# Patient Record
Sex: FEMALE | Race: WHITE
[De-identification: names, ages, dates, MRNs, and addresses within clinical notes are randomized per-mention and may not be internally consistent; named-entity substitution may affect disease eponyms.]

---

## 2018-11-11 ENCOUNTER — HOSPITAL ENCOUNTER (EMERGENCY)
Dept: HOSPITAL 47 - EC | Age: 58
Discharge: HOME | End: 2018-11-11
Payer: COMMERCIAL

## 2018-11-11 VITALS — SYSTOLIC BLOOD PRESSURE: 145 MMHG | HEART RATE: 55 BPM | DIASTOLIC BLOOD PRESSURE: 65 MMHG | TEMPERATURE: 98.3 F

## 2018-11-11 VITALS — RESPIRATION RATE: 20 BRPM

## 2018-11-11 DIAGNOSIS — E78.5: ICD-10-CM

## 2018-11-11 DIAGNOSIS — Z91.011: ICD-10-CM

## 2018-11-11 DIAGNOSIS — Z87.891: ICD-10-CM

## 2018-11-11 DIAGNOSIS — Z79.899: ICD-10-CM

## 2018-11-11 DIAGNOSIS — Z88.2: ICD-10-CM

## 2018-11-11 DIAGNOSIS — Z91.018: ICD-10-CM

## 2018-11-11 DIAGNOSIS — H53.9: ICD-10-CM

## 2018-11-11 DIAGNOSIS — Z91.09: ICD-10-CM

## 2018-11-11 DIAGNOSIS — G43.909: Primary | ICD-10-CM

## 2018-11-11 LAB
ALBUMIN SERPL-MCNC: 4.3 G/DL (ref 3.5–5)
ALP SERPL-CCNC: 64 U/L (ref 38–126)
ALT SERPL-CCNC: 47 U/L (ref 9–52)
ANION GAP SERPL CALC-SCNC: 6 MMOL/L
AST SERPL-CCNC: 37 U/L (ref 14–36)
BASOPHILS # BLD AUTO: 0 K/UL (ref 0–0.2)
BASOPHILS NFR BLD AUTO: 1 %
BUN SERPL-SCNC: 16 MG/DL (ref 7–17)
CALCIUM SPEC-MCNC: 9.7 MG/DL (ref 8.4–10.2)
CHLORIDE SERPL-SCNC: 105 MMOL/L (ref 98–107)
CO2 SERPL-SCNC: 30 MMOL/L (ref 22–30)
EOSINOPHIL # BLD AUTO: 0.1 K/UL (ref 0–0.7)
EOSINOPHIL NFR BLD AUTO: 2 %
ERYTHROCYTE [DISTWIDTH] IN BLOOD BY AUTOMATED COUNT: 4.96 M/UL (ref 3.8–5.4)
ERYTHROCYTE [DISTWIDTH] IN BLOOD: 13.2 % (ref 11.5–15.5)
GLUCOSE SERPL-MCNC: 95 MG/DL (ref 74–99)
HCT VFR BLD AUTO: 43.9 % (ref 34–46)
HGB BLD-MCNC: 14.4 GM/DL (ref 11.4–16)
LYMPHOCYTES # SPEC AUTO: 1.2 K/UL (ref 1–4.8)
LYMPHOCYTES NFR SPEC AUTO: 23 %
MCH RBC QN AUTO: 29.1 PG (ref 25–35)
MCHC RBC AUTO-ENTMCNC: 32.9 G/DL (ref 31–37)
MCV RBC AUTO: 88.6 FL (ref 80–100)
MONOCYTES # BLD AUTO: 0.3 K/UL (ref 0–1)
MONOCYTES NFR BLD AUTO: 5 %
NEUTROPHILS # BLD AUTO: 3.6 K/UL (ref 1.3–7.7)
NEUTROPHILS NFR BLD AUTO: 68 %
PLATELET # BLD AUTO: 214 K/UL (ref 150–450)
POTASSIUM SERPL-SCNC: 4.3 MMOL/L (ref 3.5–5.1)
PROT SERPL-MCNC: 7.2 G/DL (ref 6.3–8.2)
SODIUM SERPL-SCNC: 141 MMOL/L (ref 137–145)
WBC # BLD AUTO: 5.3 K/UL (ref 3.8–10.6)

## 2018-11-11 PROCEDURE — 36415 COLL VENOUS BLD VENIPUNCTURE: CPT

## 2018-11-11 PROCEDURE — 80053 COMPREHEN METABOLIC PANEL: CPT

## 2018-11-11 PROCEDURE — 99284 EMERGENCY DEPT VISIT MOD MDM: CPT

## 2018-11-11 PROCEDURE — 85025 COMPLETE CBC W/AUTO DIFF WBC: CPT

## 2018-11-11 PROCEDURE — 70450 CT HEAD/BRAIN W/O DYE: CPT

## 2018-11-11 PROCEDURE — 70496 CT ANGIOGRAPHY HEAD: CPT

## 2018-11-11 NOTE — ED
General Adult HPI





- General


Chief complaint: Eye Problems


Stated complaint: Visual Disturbance, Headache


Time Seen by Provider: 18 12:18


Source: patient, RN notes reviewed


Mode of arrival: ambulatory


Limitations: no limitations





- History of Present Illness


Initial comments: 





Patient is a 58-year-old female presented to the emergency room today with a 

chief complaint of headache and visual change.  Patient does admit that 2 days 

ago she noticed that she had a halo type appearance on the lateral portion of 

the left eye.  She states that her last a few hours and then eventually went 

away.  She states that today she woke up with a migraine headache.  She states 

the migraine is consistent with migraine headaches that she's had in the past.  

She states she's had some nausea, photosensitivity a long with a headache.  She 

states is consistent with her migraines.  She states approximately at 12:00 she 

began noticing the visual changes that she experienced 2 days ago.  She states 

again she was experiencing a halo and blurriness out of the lateral portion of 

the left eye.  Patient states that she did take her migraine medication of 

Fioricet along with an aspirin.  She states visual symptoms resolved shortly 

after taking this medication.  She states her headache is also gone at this 

time.  She states there are no symptoms currently.  She states she was just 

concerned as there is a family history of brain aneurysm.  Patient denies any 

recent fever, chills, shortness of breath, chest pain, back pain, abdominal pain

, nausea or vomiting, numbness or tingling, or any other complaints.





- Related Data


 Home Medications











 Medication  Instructions  Recorded  Confirmed


 


Butalb/Acetaminophen/Caffeine 1 tab PO DAILY PRN 10/31/16 11/11/18





[Fioricet -40 mg Capsule]   


 


Atorvastatin [Lipitor] 10 mg PO HS 18


 


Biotin 5 mg PO DAILY 18


 


Calcium Carbonate [Calcium] 600 mg PO DAILY 18


 


Cholecalciferol [Vitamin D3] 1,000 unit PO DAILY 18


 


Omega-3 Fatty Acids/Fish Oil [Fish 1 cap PO DAILY 18





Oil 1,000 mg Softgel]   











 Allergies











Allergy/AdvReac Type Severity Reaction Status Date / Time


 


cat dander Allergy  Unknown Verified 11/11/18 13:23


 


Milk Containing Products Allergy  Nausea & Verified 18 13:23





[Dairy]   Vomiting  


 


orange Allergy  Rash/Hives Verified 18 13:23


 


Sulfa (Sulfonamide Allergy  Rash/Hives Verified 18 13:23





Antibiotics)     


 


tomato Allergy  Rash/Hives Verified 18 13:23














Review of Systems


ROS Statement: 


Those systems with pertinent positive or pertinent negative responses have been 

documented in the HPI.





ROS Other: All systems not noted in ROS Statement are negative.





Past Medical History


Past Medical History: Hyperlipidemia, Skin Disorder


Additional Past Medical History / Comment(s): ABDOMINAL PAIN, BURNING, 

HYPOGLYCEMIA, HX PSORIASIS, HX OF MIGRAINES, HEART MURMUR,


History of Any Multi-Drug Resistant Organisms: None Reported


Past Surgical History:  Section, Hysterectomy


Additional Past Surgical History / Comment(s): SKIN BX


Past Anesthesia/Blood Transfusion Reactions: Previous Problems w/ Anesthesia


Additional Past Anesthesia/Blood Transfusion Reaction / Comment(s): HYPOTENSION,


Past Psychological History: No Psychological Hx Reported


Smoking Status: Former smoker


Past Alcohol Use History: None Reported


Past Drug Use History: None Reported





- Past Family History


  ** Mother


Family Medical History: Pulmonary Embolus





General Exam





- General Exam Comments


Initial Comments: 





General:  The patient is awake and alert, in no distress, and does not appear 

acutely ill. 


Eye:  Pupils are equal, round and reactive to light. Extra-ocular movements are 

intact.  No nystagmus.  There is normal conjunctiva bilaterally.  No signs of 

icterus.  


Ears, nose, mouth and throat:  There are moist mucous membranes and no oral 

lesions. 


Neck:  The neck is supple, there is no tenderness or JVD.  


Cardiovascular:  There is a regular rate and rhythm. No murmur, rub or gallop 

is appreciated.


Respiratory:  Lungs are clear to auscultation, respirations are non-labored, 

breath sounds are equal.  No wheezes, stridor, rales, or rhonchi.


Musculoskeletal:  Normal ROM, no tenderness.  Sensation intact. Strength 5/5. 

Pulses equal bilaterally 2+.  


Neurological:  A&O x 3. CN II-XII intact, There are no obvious motor or sensory 

deficits. Coordination appears grossly intact. Speech is normal.


Skin:  Skin is warm and dry and no rashes or lesions are noted. 


Psychiatric:  Cooperative, appropriate mood & affect, normal judgment.  


Limitations: no limitations





Course


 Vital Signs











  18





  12:12 14:01


 


Temperature 98.6 F 


 


Pulse Rate 63 52 L


 


Respiratory 16 20





Rate  


 


Blood Pressure 147/78 136/83


 


O2 Sat by Pulse 99 100





Oximetry  














Medical Decision Making





- Medical Decision Making





CT with and without contrast obtained of the head negative for any acute 

abnormalities.  Labs reviewed.  Patient's been asymptomatic here in emergency 

room.  Patient advised to follow-up with ophthalmologist tomorrow.  Advised 

return if symptoms recur.  Otherwise follow-up family physician over the next 2 

days.





- Lab Data


Result diagrams: 


 18 14:00





 18 14:00


 Lab Results











  18 Range/Units





  14:00 14:00 


 


WBC  5.3   (3.8-10.6)  k/uL


 


RBC  4.96   (3.80-5.40)  m/uL


 


Hgb  14.4   (11.4-16.0)  gm/dL


 


Hct  43.9   (34.0-46.0)  %


 


MCV  88.6   (80.0-100.0)  fL


 


MCH  29.1   (25.0-35.0)  pg


 


MCHC  32.9   (31.0-37.0)  g/dL


 


RDW  13.2   (11.5-15.5)  %


 


Plt Count  214   (150-450)  k/uL


 


Neutrophils %  68   %


 


Lymphocytes %  23   %


 


Monocytes %  5   %


 


Eosinophils %  2   %


 


Basophils %  1   %


 


Neutrophils #  3.6   (1.3-7.7)  k/uL


 


Lymphocytes #  1.2   (1.0-4.8)  k/uL


 


Monocytes #  0.3   (0-1.0)  k/uL


 


Eosinophils #  0.1   (0-0.7)  k/uL


 


Basophils #  0.0   (0-0.2)  k/uL


 


Sodium   141  (137-145)  mmol/L


 


Potassium   4.3  (3.5-5.1)  mmol/L


 


Chloride   105  ()  mmol/L


 


Carbon Dioxide   30  (22-30)  mmol/L


 


Anion Gap   6  mmol/L


 


BUN   16  (7-17)  mg/dL


 


Creatinine   0.73  (0.52-1.04)  mg/dL


 


Est GFR (CKD-EPI)AfAm   >90  (>60 ml/min/1.73 sqM)  


 


Est GFR (CKD-EPI)NonAf   >90  (>60 ml/min/1.73 sqM)  


 


Glucose   95  (74-99)  mg/dL


 


Calcium   9.7  (8.4-10.2)  mg/dL


 


Total Bilirubin   0.3  (0.2-1.3)  mg/dL


 


AST   37 H  (14-36)  U/L


 


ALT   47  (9-52)  U/L


 


Alkaline Phosphatase   64  ()  U/L


 


Total Protein   7.2  (6.3-8.2)  g/dL


 


Albumin   4.3  (3.5-5.0)  g/dL














Disposition


Clinical Impression: 


 Migraine, Visual disturbance





Disposition: HOME SELF-CARE


Condition: Good


Instructions:  Migraine Headache (ED)


Additional Instructions: 


Please follow-up with ophthalmologist tomorrow as discussed.  Please call 

family doctor over the next 2 days.  Return to emergency room if any symptoms 

increase or worsen.


Is patient prescribed a controlled substance at d/c from ED?: No


Referrals: 


Colton Pedraza DO [Primary Care Provider] - 1-2 days


Owen Martinez MD [STAFF PHYSICIAN] - 1-2 days


Time of Disposition: 14:54

## 2018-11-11 NOTE — CT
EXAMINATION TYPE: CT angio head

 

DATE OF EXAM: 11/11/2018

 

COMPARISON: None

 

HISTORY: Visual disturbance, headaches

 

CT DLP: 868 mGycm

Automated exposure control for dose reduction was used.

 

CONTRAST: 

Performed with IV Contrast, patient injected with 100 mL of Isovue 370.

There are 3-D post processed images.

FINDINGS: 

There is arterial flow in the anterior middle and posterior cerebral arteries. There is arterial flow
 in the vertebrobasilar artery system. There is bilateral patency of the vertebral arteries. There is
 bilateral arterial flow in the intracranial internal carotid arteries. There is normal contrast opac
ification of the venous sinuses. I see no evidence of intracranial arterial stenosis. I see no eviden
ce of aneurysm or neovascularity. There is no mass effect.

 

IMPRESSION: 

NEGATIVE CT ANGIOGRAM OF THE BRAIN.

## 2018-11-11 NOTE — CT
EXAMINATION TYPE: CT brain wo con

 

DATE OF EXAM: 11/11/2018

 

COMPARISON: Previous study dated 7/17/2013.

 

HISTORY: Migraines, vision changes

 

CT DLP: 1191.4 mGycm

Automated exposure control for dose reduction was used.

 

FINDINGS: 

Central structures are midline. There is no evidence of hydrocephalus. No acute focal lesion, mass ef
fect or midline shift is seen. I do not see evidence of intracranial blood.

 

Visualized portions of the paranasal sinuses and mastoids are clear. The bony calvarium is intact.

 

IMPRESSION: 

 

NO ACUTE INTRACRANIAL ABNORMALITY.

## 2020-10-18 ENCOUNTER — HOSPITAL ENCOUNTER (INPATIENT)
Dept: HOSPITAL 47 - EC | Age: 60
LOS: 3 days | Discharge: HOME | DRG: 287 | End: 2020-10-21
Payer: COMMERCIAL

## 2020-10-18 DIAGNOSIS — Z98.891: ICD-10-CM

## 2020-10-18 DIAGNOSIS — Z91.018: ICD-10-CM

## 2020-10-18 DIAGNOSIS — Z90.710: ICD-10-CM

## 2020-10-18 DIAGNOSIS — Z88.2: ICD-10-CM

## 2020-10-18 DIAGNOSIS — Z91.011: ICD-10-CM

## 2020-10-18 DIAGNOSIS — Z87.891: ICD-10-CM

## 2020-10-18 DIAGNOSIS — Z91.09: ICD-10-CM

## 2020-10-18 DIAGNOSIS — G43.909: ICD-10-CM

## 2020-10-18 DIAGNOSIS — R07.89: Primary | ICD-10-CM

## 2020-10-18 DIAGNOSIS — Z79.899: ICD-10-CM

## 2020-10-18 DIAGNOSIS — I08.0: ICD-10-CM

## 2020-10-18 DIAGNOSIS — Z79.82: ICD-10-CM

## 2020-10-18 DIAGNOSIS — L40.9: ICD-10-CM

## 2020-10-18 DIAGNOSIS — Z98.890: ICD-10-CM

## 2020-10-18 DIAGNOSIS — R00.1: ICD-10-CM

## 2020-10-18 DIAGNOSIS — Z82.49: ICD-10-CM

## 2020-10-18 DIAGNOSIS — E78.5: ICD-10-CM

## 2020-10-18 DIAGNOSIS — I27.20: ICD-10-CM

## 2020-10-18 LAB
ALBUMIN SERPL-MCNC: 4.1 G/DL (ref 3.5–5)
ALP SERPL-CCNC: 61 U/L (ref 38–126)
ALT SERPL-CCNC: 28 U/L (ref 4–34)
ANION GAP SERPL CALC-SCNC: 4 MMOL/L
APTT BLD: 22.7 SEC (ref 22–30)
AST SERPL-CCNC: 36 U/L (ref 14–36)
BASOPHILS # BLD AUTO: 0.1 K/UL (ref 0–0.2)
BASOPHILS NFR BLD AUTO: 1 %
BUN SERPL-SCNC: 17 MG/DL (ref 7–17)
CALCIUM SPEC-MCNC: 9.4 MG/DL (ref 8.4–10.2)
CHLORIDE SERPL-SCNC: 103 MMOL/L (ref 98–107)
CO2 SERPL-SCNC: 28 MMOL/L (ref 22–30)
EOSINOPHIL # BLD AUTO: 0.1 K/UL (ref 0–0.7)
EOSINOPHIL NFR BLD AUTO: 2 %
ERYTHROCYTE [DISTWIDTH] IN BLOOD BY AUTOMATED COUNT: 4.81 M/UL (ref 3.8–5.4)
ERYTHROCYTE [DISTWIDTH] IN BLOOD: 13 % (ref 11.5–15.5)
GLUCOSE SERPL-MCNC: 98 MG/DL (ref 74–99)
HCT VFR BLD AUTO: 44 % (ref 34–46)
HGB BLD-MCNC: 14 GM/DL (ref 11.4–16)
INR PPP: 1 (ref ?–1.2)
LYMPHOCYTES # SPEC AUTO: 2 K/UL (ref 1–4.8)
LYMPHOCYTES NFR SPEC AUTO: 31 %
MCH RBC QN AUTO: 29.1 PG (ref 25–35)
MCHC RBC AUTO-ENTMCNC: 31.8 G/DL (ref 31–37)
MCV RBC AUTO: 91.4 FL (ref 80–100)
MONOCYTES # BLD AUTO: 0.4 K/UL (ref 0–1)
MONOCYTES NFR BLD AUTO: 6 %
NEUTROPHILS # BLD AUTO: 3.9 K/UL (ref 1.3–7.7)
NEUTROPHILS NFR BLD AUTO: 60 %
PLATELET # BLD AUTO: 205 K/UL (ref 150–450)
POTASSIUM SERPL-SCNC: 4 MMOL/L (ref 3.5–5.1)
PROT SERPL-MCNC: 6.7 G/DL (ref 6.3–8.2)
PT BLD: 9.9 SEC (ref 9–12)
SODIUM SERPL-SCNC: 135 MMOL/L (ref 137–145)
WBC # BLD AUTO: 6.6 K/UL (ref 3.8–10.6)

## 2020-10-18 PROCEDURE — 83735 ASSAY OF MAGNESIUM: CPT

## 2020-10-18 PROCEDURE — 80053 COMPREHEN METABOLIC PANEL: CPT

## 2020-10-18 PROCEDURE — 84484 ASSAY OF TROPONIN QUANT: CPT

## 2020-10-18 PROCEDURE — 99285 EMERGENCY DEPT VISIT HI MDM: CPT

## 2020-10-18 PROCEDURE — 93458 L HRT ARTERY/VENTRICLE ANGIO: CPT

## 2020-10-18 PROCEDURE — 96361 HYDRATE IV INFUSION ADD-ON: CPT

## 2020-10-18 PROCEDURE — 96360 HYDRATION IV INFUSION INIT: CPT

## 2020-10-18 PROCEDURE — 80048 BASIC METABOLIC PNL TOTAL CA: CPT

## 2020-10-18 PROCEDURE — 71046 X-RAY EXAM CHEST 2 VIEWS: CPT

## 2020-10-18 PROCEDURE — 85025 COMPLETE CBC W/AUTO DIFF WBC: CPT

## 2020-10-18 PROCEDURE — 93017 CV STRESS TEST TRACING ONLY: CPT

## 2020-10-18 PROCEDURE — 93306 TTE W/DOPPLER COMPLETE: CPT

## 2020-10-18 PROCEDURE — 80061 LIPID PANEL: CPT

## 2020-10-18 PROCEDURE — 83880 ASSAY OF NATRIURETIC PEPTIDE: CPT

## 2020-10-18 PROCEDURE — 85730 THROMBOPLASTIN TIME PARTIAL: CPT

## 2020-10-18 PROCEDURE — 36415 COLL VENOUS BLD VENIPUNCTURE: CPT

## 2020-10-18 PROCEDURE — 78452 HT MUSCLE IMAGE SPECT MULT: CPT

## 2020-10-18 PROCEDURE — 93005 ELECTROCARDIOGRAM TRACING: CPT

## 2020-10-18 PROCEDURE — 85610 PROTHROMBIN TIME: CPT

## 2020-10-18 NOTE — ED
SOB HPI





- General


Source: patient


Mode of arrival: ambulatory


Limitations: no limitations





<Chloe Barragan - Last Filed: 10/19/20 00:13>





<Kristi Piper - Last Filed: 10/20/20 13:26>





- General


Chief Complaint: Shortness of Breath


Stated Complaint: Poss Asthma Attack


Time Seen by Provider: 10/18/20 20:05





- History of Present Illness


Initial Comments: 





Patient is a 60-year-old female presenting to emergency Department with 

complaints of an episode of shortness of breath that happened while she was 

cooking dinner just prior to arrival.  Patient states she has felt fine all day,

was cooking dinner and then had an episode of chest tightness and shortness of 

breath that lasted about 2-3 minutes.  Patient states she had a really hard time

catching her breath during this time.  She denies having any sharp chest pains 

but states she did feel tight and "compressed."  Patient states now she is no 

longer having that as it only lasted a few minutes but she is feeling some mild 

nausea and a headache.  She states she does have a history of migraines and did 

not take her Fioricet before she left for the hospital.  Patient denies a 

history of heart disease, she does take a statin for elevated cholesterol.  She 

denies any new medications.  She states it has been a long time since her recent

stress test.  She denies history of asthma or COPD.  Eyes any recent fever, 

chills, cough, congestion, abdominal pain, vomiting or diarrhea.  She has no 

further complaints at this time.  Upon arrival to the ER, her vital signs are 

stable. (Chloe Barragan)





- Related Data


                                Home Medications











 Medication  Instructions  Recorded  Confirmed


 


Butalb/Acetaminophen/Caffeine 1 tab PO DAILY PRN 10/31/16 10/18/20





[Fioricet -40 mg Capsule]   


 


Atorvastatin [Lipitor] 10 mg PO HS 11/11/18 10/18/20


 


Biotin 5 mg PO DAILY 11/11/18 10/18/20


 


Cholecalciferol [Vitamin D3] 1,000 unit PO DAILY 11/11/18 10/18/20


 


Omega-3 Fatty Acids/Fish Oil [Fish 1 cap PO DAILY 11/11/18 10/18/20





Oil 1,000 mg Softgel]   


 


Vitamin E 100 unit PO DAILY 10/18/20 10/18/20











                                    Allergies











Allergy/AdvReac Type Severity Reaction Status Date / Time


 


cat dander Allergy  Unknown Verified 10/18/20 22:28


 


Milk Containing Products Allergy  Nausea & Verified 10/18/20 22:28





[Dairy]   Vomiting  


 


orange Allergy  Rash/Hives Verified 10/18/20 22:28


 


Sulfa (Sulfonamide Allergy  Rash/Hives Verified 10/18/20 22:28





Antibiotics)     


 


tomato Allergy  Rash/Hives Verified 10/18/20 22:28














Review of Systems


ROS Other: All systems not noted in ROS Statement are negative.





<Chloe Barragan - Last Filed: 10/19/20 00:13>


ROS Other: All systems not noted in ROS Statement are negative.





<Kristi Piper - Last Filed: 10/20/20 13:26>


ROS Statement: 


Those systems with pertinent positive or pertinent negative responses have been 

documented in the HPI.








Past Medical History


Past Medical History: Hyperlipidemia, Skin Disorder


Additional Past Medical History / Comment(s): ABDOMINAL PAIN, BURNING, 

HYPOGLYCEMIA, HX PSORIASIS, HX OF MIGRAINES, HEART MURMUR,


History of Any Multi-Drug Resistant Organisms: None Reported


Past Surgical History:  Section, Hysterectomy


Additional Past Surgical History / Comment(s): SKIN BX


Past Anesthesia/Blood Transfusion Reactions: Previous Problems w/ Anesthesia


Additional Past Anesthesia/Blood Transfusion Reaction / Comment(s): HYPOTENSION,


Past Psychological History: No Psychological Hx Reported


Smoking Status: Never smoker


Past Alcohol Use History: None Reported


Past Drug Use History: None Reported





- Past Family History


  ** Mother


Family Medical History: Pulmonary Embolus





<Chloe Barragan - Last Filed: 10/19/20 00:13>





General Exam


Limitations: no limitations





<Chloe Barragan - Last Filed: 10/19/20 00:13>





- General Exam Comments


Initial Comments: 





GENERAL: 


Patient is well-developed and well-nourished.  Patient is nontoxic and in no 

acute distress.





HEAD: 


Atraumatic, normocephalic.





EYES:


Pupils equal round and reactive to light, extraocular movements intact, sclera 

anicteric, conjunctiva are normal.  Eyelids were unremarkable.





ENT: 


TMs normal, nares patent, oropharynx clear without exudates.  Moist mucous 

membranes.





NECK: 


Normal range of motion, supple without lymphadenopathy or JVD.





LUNGS:


Unlabored respirations.  Breath sounds clear to auscultation bilaterally and 

equal.  No wheezes rales or rhonchi.





HEART:


Regular rate and rhythm without murmurs, rubs or gallops.





ABDOMEN: 


Soft, nontender, normoactive bowel sounds.  No guarding, no rebound.  No masses 

appreciated.





: Deferred 





MUSCULOSKELETAL: 


Normal extremities with adequate strength and normal range of motion, no pitting

 or edema.  No clubbing or cyanosis.





NEUROLOGICAL: 


Patient is alert and oriented x 3.  Motor and sensory are also intact.  Cranial 

nerves II through XII grossly intact.  Symmetrical smile.  Normal speech, normal

 gait.   





PSYCH:


Normal mood, normal affect.





SKIN:


 Warm, Dry, normal turgor, no rashes or lesions noted. (Chloe Barragan)





Course





                                   Vital Signs











  10/18/20 10/18/20





  19:58 22:06


 


Temperature 97.5 F L 


 


Pulse Rate 52 L 65


 


Respiratory 16 17





Rate  


 


Blood Pressure 168/94 131/76


 


O2 Sat by Pulse 100 99





Oximetry  














Medical Decision Making





- Lab Data


Result diagrams: 


                                 10/18/20 20:48





                                 10/18/20 20:48





<Chloe Barragan - Last Filed: 10/19/20 00:13>





- Lab Data


Result diagrams: 


                                 10/18/20 20:48





                                 10/18/20 20:48





<Kristi Piper - Last Filed: 10/20/20 13:26>





- Medical Decision Making





Patient is a 60-year-old female presenting after 2-3 minute episode of shortness

 of breath and chest tightness while she was cooking dinner.  Her vital signs 

are stable upon arrival.  Her exam is unremarkable.  EKG shows no acute process.

  Chest x-ray is normal.  Abdomen also completely unremarkable, troponin is 

negative.  I discussed with patient that given her symptoms, recommended 

admission for serial troponins and cardiac consult.  Patient is agreement with 

this plan and care.  Case discussed with Dr. Piper.  Patient accepted by Dr. Maloney. (Chloe Barragan)


I was available for consultation in the emergency department.  The history and 

physical exam were done by the midlevel provider. I was consulted for this patie

nts care. I reviewed the case with the midlevel provider and based on their 

presentation of the patient, I agree with the assessment, medical decision 

making and plan of care as documented. I spoke with Dr. Maloney who agreed to admit

 the patient. 


Chart was dictated using Dragon dictation software.  Attempts were made to 

correct any dictation errors however some typographical errors may persist. 


Patient was seen during a national state of emergency due to the Covid-19 

pandemic. 


 (Kristi Piper)





- Lab Data





                                   Lab Results











  10/18/20 10/18/20 10/18/20 Range/Units





  20:48 20:48 20:48 


 


WBC  6.6    (3.8-10.6)  k/uL


 


RBC  4.81    (3.80-5.40)  m/uL


 


Hgb  14.0    (11.4-16.0)  gm/dL


 


Hct  44.0    (34.0-46.0)  %


 


MCV  91.4    (80.0-100.0)  fL


 


MCH  29.1    (25.0-35.0)  pg


 


MCHC  31.8    (31.0-37.0)  g/dL


 


RDW  13.0    (11.5-15.5)  %


 


Plt Count  205    (150-450)  k/uL


 


Neutrophils %  60    %


 


Lymphocytes %  31    %


 


Monocytes %  6    %


 


Eosinophils %  2    %


 


Basophils %  1    %


 


Neutrophils #  3.9    (1.3-7.7)  k/uL


 


Lymphocytes #  2.0    (1.0-4.8)  k/uL


 


Monocytes #  0.4    (0-1.0)  k/uL


 


Eosinophils #  0.1    (0-0.7)  k/uL


 


Basophils #  0.1    (0-0.2)  k/uL


 


PT   9.9   (9.0-12.0)  sec


 


INR   1.0   (<1.2)  


 


APTT   22.7   (22.0-30.0)  sec


 


Sodium    135 L  (137-145)  mmol/L


 


Potassium    4.0  (3.5-5.1)  mmol/L


 


Chloride    103  ()  mmol/L


 


Carbon Dioxide    28  (22-30)  mmol/L


 


Anion Gap    4  mmol/L


 


BUN    17  (7-17)  mg/dL


 


Creatinine    0.83  (0.52-1.04)  mg/dL


 


Est GFR (CKD-EPI)AfAm    89  (>60 ml/min/1.73 sqM)  


 


Est GFR (CKD-EPI)NonAf    77  (>60 ml/min/1.73 sqM)  


 


Glucose    98  (74-99)  mg/dL


 


Calcium    9.4  (8.4-10.2)  mg/dL


 


Total Bilirubin    0.3  (0.2-1.3)  mg/dL


 


AST    36  (14-36)  U/L


 


ALT    28  (4-34)  U/L


 


Alkaline Phosphatase    61  ()  U/L


 


Troponin I     (0.000-0.034)  ng/mL


 


NT-Pro-B Natriuret Pep     pg/mL


 


Total Protein    6.7  (6.3-8.2)  g/dL


 


Albumin    4.1  (3.5-5.0)  g/dL


 


Triglycerides     (<150)  mg/dL


 


Cholesterol     (<200)  mg/dL


 


LDL Cholesterol, Calc     (0-99)  mg/dL


 


HDL Cholesterol     (40-60)  mg/dL














  10/18/20 10/18/20 10/18/20 Range/Units





  20:48 20:48 23:46 


 


WBC     (3.8-10.6)  k/uL


 


RBC     (3.80-5.40)  m/uL


 


Hgb     (11.4-16.0)  gm/dL


 


Hct     (34.0-46.0)  %


 


MCV     (80.0-100.0)  fL


 


MCH     (25.0-35.0)  pg


 


MCHC     (31.0-37.0)  g/dL


 


RDW     (11.5-15.5)  %


 


Plt Count     (150-450)  k/uL


 


Neutrophils %     %


 


Lymphocytes %     %


 


Monocytes %     %


 


Eosinophils %     %


 


Basophils %     %


 


Neutrophils #     (1.3-7.7)  k/uL


 


Lymphocytes #     (1.0-4.8)  k/uL


 


Monocytes #     (0-1.0)  k/uL


 


Eosinophils #     (0-0.7)  k/uL


 


Basophils #     (0-0.2)  k/uL


 


PT     (9.0-12.0)  sec


 


INR     (<1.2)  


 


APTT     (22.0-30.0)  sec


 


Sodium     (137-145)  mmol/L


 


Potassium     (3.5-5.1)  mmol/L


 


Chloride     ()  mmol/L


 


Carbon Dioxide     (22-30)  mmol/L


 


Anion Gap     mmol/L


 


BUN     (7-17)  mg/dL


 


Creatinine     (0.52-1.04)  mg/dL


 


Est GFR (CKD-EPI)AfAm     (>60 ml/min/1.73 sqM)  


 


Est GFR (CKD-EPI)NonAf     (>60 ml/min/1.73 sqM)  


 


Glucose     (74-99)  mg/dL


 


Calcium     (8.4-10.2)  mg/dL


 


Total Bilirubin     (0.2-1.3)  mg/dL


 


AST     (14-36)  U/L


 


ALT     (4-34)  U/L


 


Alkaline Phosphatase     ()  U/L


 


Troponin I  <0.012   <0.012  (0.000-0.034)  ng/mL


 


NT-Pro-B Natriuret Pep   60   pg/mL


 


Total Protein     (6.3-8.2)  g/dL


 


Albumin     (3.5-5.0)  g/dL


 


Triglycerides     (<150)  mg/dL


 


Cholesterol     (<200)  mg/dL


 


LDL Cholesterol, Calc     (0-99)  mg/dL


 


HDL Cholesterol     (40-60)  mg/dL














  10/19/20 10/19/20 Range/Units





  02:33 02:33 


 


WBC    (3.8-10.6)  k/uL


 


RBC    (3.80-5.40)  m/uL


 


Hgb    (11.4-16.0)  gm/dL


 


Hct    (34.0-46.0)  %


 


MCV    (80.0-100.0)  fL


 


MCH    (25.0-35.0)  pg


 


MCHC    (31.0-37.0)  g/dL


 


RDW    (11.5-15.5)  %


 


Plt Count    (150-450)  k/uL


 


Neutrophils %    %


 


Lymphocytes %    %


 


Monocytes %    %


 


Eosinophils %    %


 


Basophils %    %


 


Neutrophils #    (1.3-7.7)  k/uL


 


Lymphocytes #    (1.0-4.8)  k/uL


 


Monocytes #    (0-1.0)  k/uL


 


Eosinophils #    (0-0.7)  k/uL


 


Basophils #    (0-0.2)  k/uL


 


PT    (9.0-12.0)  sec


 


INR    (<1.2)  


 


APTT    (22.0-30.0)  sec


 


Sodium    (137-145)  mmol/L


 


Potassium    (3.5-5.1)  mmol/L


 


Chloride    ()  mmol/L


 


Carbon Dioxide    (22-30)  mmol/L


 


Anion Gap    mmol/L


 


BUN    (7-17)  mg/dL


 


Creatinine    (0.52-1.04)  mg/dL


 


Est GFR (CKD-EPI)AfAm    (>60 ml/min/1.73 sqM)  


 


Est GFR (CKD-EPI)NonAf    (>60 ml/min/1.73 sqM)  


 


Glucose    (74-99)  mg/dL


 


Calcium    (8.4-10.2)  mg/dL


 


Total Bilirubin    (0.2-1.3)  mg/dL


 


AST    (14-36)  U/L


 


ALT    (4-34)  U/L


 


Alkaline Phosphatase    ()  U/L


 


Troponin I  <0.012   (0.000-0.034)  ng/mL


 


NT-Pro-B Natriuret Pep    pg/mL


 


Total Protein    (6.3-8.2)  g/dL


 


Albumin    (3.5-5.0)  g/dL


 


Triglycerides   56  (<150)  mg/dL


 


Cholesterol   154  (<200)  mg/dL


 


LDL Cholesterol, Calc   81  (0-99)  mg/dL


 


HDL Cholesterol   62 H  (40-60)  mg/dL














- EKG Data


EKG Comments: 





Sinus bradycardia, left axis deviation, no signs of acute ischemia.  Ventricular

 rate 52, ND interval 138, QTc 446. (Chloe Barragan)





Disposition


Decision Date: 10/18/20


Decision Time: 22:11





<Chloe Barragan - Last Filed: 10/19/20 00:13>





<Kristi Piper - Last Filed: 10/20/20 13:26>


Clinical Impression: 


 Chest tightness, Dyspnea





Disposition: ADMITTED AS IP TO THIS [facility]


Condition: Stable

## 2020-10-18 NOTE — XR
EXAMINATION TYPE: XR chest 2V

 

DATE OF EXAM: 10/18/2020

 

COMPARISON: NONE

 

HISTORY: Short of breath

 

TECHNIQUE: 2 views

 

FINDINGS: Heart is normal. Lungs are clear of infiltrate. There are no hilar masses. Mediastinum is n
ormal. There is some pleural thickening at the lung apices.

 

IMPRESSION: Mild pleural scarring at the lung apices. No acute lung disease. Normal heart.

## 2020-10-19 VITALS — RESPIRATION RATE: 16 BRPM

## 2020-10-19 LAB
CHOLEST SERPL-MCNC: 154 MG/DL (ref ?–200)
HDLC SERPL-MCNC: 62 MG/DL (ref 40–60)
LDLC SERPL CALC-MCNC: 81 MG/DL (ref 0–99)
TRIGL SERPL-MCNC: 56 MG/DL (ref ?–150)

## 2020-10-19 RX ADMIN — PANTOPRAZOLE SODIUM SCH MG: 40 INJECTION, POWDER, FOR SOLUTION INTRAVENOUS at 17:50

## 2020-10-19 RX ADMIN — ATORVASTATIN CALCIUM SCH MG: 10 TABLET, FILM COATED ORAL at 20:32
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
## 2020-10-19 NOTE — NM
EXAMINATION TYPE: NM stress lexiscan cardiolite

 

DATE OF EXAM: 10/19/2020

 

COMPARISON: NONE

 

HISTORY: Chest pain

 

TECHNIQUE:  After the intravenous administration of 9.8 mCi Tc 99m Sestamibi - Cardiolite resting SPE
CT images acquired 70 minutes post injection. 

 

The patient received 0.4mg Lexiscan, 27.8 mCi Tc 99m Sestamibi - Stress images obtained 30 minutes po
st injection 

 

FINDINGS:  

 

Review of stress and rest SPECT images demonstrates some mild decreased left ventricular apical uptak
e on stress as compared to rest images.  Gated analysis shows normal wall motion with an estimated le
ft ventricular ejection fraction of 65 %.

 

 

 

IMPRESSION:  

 

Apical left ventricular myocardial ischemia, consider echo correlation for elevated ejection fraction

## 2020-10-19 NOTE — P.STRESS
- Stress Test Note


Stress Test Results/Findings: 


Exam Performed: NM stress lexiscan cardiolite


Exam Date: 10/19/20


Reason for Exam: Chest Pain





Height: 5 ft 6 in


Weight: 68.04 kg


Protocol: Lexiscan


Stage: na


Duration of Exercise: na





Resting Heart Rate: 46


Resting Blood Pressure: 124/72


Maximum Achieved Heart Rate: 81


Maximum Achieved Blood Pressure: 132/72


85% PMHR: 135


100% PMHR: 159


METS: na


Technologist Comment: 





Stress Test Results/Findings: 


At baseline EKG showed ormal sinus rhythm, normal axis,nonspecific T-wave in

versions in aVL, AVR, V1  Patient recieved IV infusion of Lexiscan 0.4mg and at 

peak infusion EKG showed o significant EKG changes from baseline.





Conclusions:


1. Normal EKG response to Lexiscan infusion


2. Nuclear imaging to be reported separately.

## 2020-10-19 NOTE — ECHOF
Referral Reason:LV function



MEASUREMENTS

--------

HEIGHT: 167.6 cm

WEIGHT: 68.0 kg

BP: 130/75

RVIDd:   2.8 cm     (< 3.3)

IVSd:   1.4 cm     (0.6 - 1.1)

LVIDd:   3.9 cm     (3.9 - 5.3)

LVPWd:   1.5 cm     (0.6 - 1.1)

IVSs:   1.7 cm

LVIDs:   2.7 cm

LVPWs:   2.1 cm

LAESV Index (A-L):   28.66 ml/m

MV E Gordo:   0.89 m/s

MV DecT:   203 ms

MV A [place]:   0.69 m/s

MV E/A Ratio:   1.29 

RAP:   5.00 mmHg

RVSP:   38.14 mmHg







FINDINGS

--------

Resting bradycardia (HR<60bpm).

This was a technically adequate study.

The left ventricular size is normal.   There is moderate concentric left ventricular hypertrophy.   O
verall left ventricular systolic function is normal with, an EF between 55 - 60 %.   The diastolic fi
lling pattern is normal for the age of the patient 11.05.

The right ventricle is normal in size.

Normal LA  size by volume 22+/-6 ml/m2.

The right atrial size is normal.

Interatrial and interventricular septum intact.

The aortic valve is trileaflet and appears structurally normal.   There is mild aortic valve sclerosi
s.   There is no evidence of aortic regurgitation.   There is no evidence of aortic stenosis.

Mild-to-moderate mitral regurgitation is present.

Mild tricuspid regurgitation present.   There is mild pulmonary hypertension.   The right ventricular
 systolic pressure, as measured by Doppler, is 38.14mmHg.

There is no pulmonic regurgitation present.

The aortic root size is normal.

IVC Not well visulized.

There is a trivial pericardial effusion present.



CONCLUSIONS

--------

1. The left ventricular size is normal.

2. There is moderate concentric left ventricular hypertrophy.

3. Overall left ventricular systolic function is normal with, an EF between 55 - 60 %.

4. The diastolic filling pattern is normal for the age of the patient 11.05

5. Normal LA size by volume 22+/-6 ml/m2.

6. There is mild aortic valve sclerosis.

7. Mild-to-moderate mitral regurgitation is present.

8. Mild tricuspid regurgitation present.

9. There is mild pulmonary hypertension.

10. The right ventricular systolic pressure, as measured by Doppler, is 38.14mmHg.





SONOGRAPHER: Dorothy Lawrence RDCS
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
## 2020-10-20 RX ADMIN — PANTOPRAZOLE SODIUM SCH MG: 40 INJECTION, POWDER, FOR SOLUTION INTRAVENOUS at 08:01

## 2020-10-20 RX ADMIN — ATORVASTATIN CALCIUM SCH: 10 TABLET, FILM COATED ORAL at 22:42

## 2020-10-20 RX ADMIN — ASPIRIN 81 MG CHEWABLE TABLET SCH MG: 81 TABLET CHEWABLE at 08:01

## 2020-10-20 NOTE — P.PN
Subjective


Progress Note Date: 10/20/20





HISTORY OF PRESENT ILLNESS:  Patient underwent Lexiscan stress test yesterday 

revealing apical left ventricular myocardial ischemia. Echocardiogram completed 

revealed ejection fraction between 55 and 60%, mild-to-moderate mitral 

regurgitation, mild tricuspid regurgitation, and mild pulmonary hypertension. 

Patient currently denies chest pain or pressure.  Denies shortness of breath.  

Denies dizziness or lightheadedness.  Vital signs are stable.Heart rate remains 

in the 50s.





PHYSICAL EXAM: 


VITAL SIGNS: Reviewed.


GENERAL: Well-developed in no acute distress. 


NECK: Supple. No JVD or thyromegaly


LUNGS: Respirations even and unlabored. Lungs essentially clear to auscultation 

bilaterally.


HEART: Regular rate and rhythm.  S1 and S2 heard.


EXTREMITIES: Normal range of motion.  No clubbing or cyanosis.  Peripheral 

pulses intact.  No lower extremity edema





ASSESSMENT: 


Chest pain


Abnormal stress test


Asymptomatic sinus bradycardia


Hyperlipidemia, controlled





PLAN: 


Continue current cardiac medications


Patient scheduled for heart catheterization tomorrow with Dr. Hoyos





Nurse practitioner note has been reviewed by physician. Signing provider agrees 

with the documented findings, assessment, and plan of care. 








Objective





- Vital Signs


Vital signs: 


                                   Vital Signs











Temp  97.6 F   10/20/20 07:52


 


Pulse  55 L  10/20/20 07:52


 


Resp  16   10/20/20 09:00


 


BP  110/69   10/20/20 07:52


 


Pulse Ox  97   10/20/20 07:52








                                 Intake & Output











 10/19/20 10/20/20 10/20/20





 18:59 06:59 18:59


 


Intake Total 300  240


 


Balance 300  240


 


Weight 68.04 kg  


 


Intake:   


 


  Oral 300  240


 


Other:   


 


  Voiding Method Toilet Toilet Toilet


 


  # Voids 2 1 


 


  # Bowel Movements  2 














- Labs


CBC & Chem 7: 


                                 10/18/20 20:48





                                 10/18/20 20:48

## 2020-10-20 NOTE — P.PN
Subjective


Progress Note Date: 10/20/20


This is a 60-year-old female with past medical history of hyperlipidemia, 

hyperglycemia, psoriasis, migraines, extensive ex-smoker; smoked 1-1/2 packs per

day 22 years, presented to the ER with complaints of chest pain accompanied by 

shortness of breath.  Patient stated she was standing up in the kitchen "salad 

spinning", developed abrupt left-sided nonradiating "tight" chest pain with 

grasping shortness of breath accompanied by nausea and generalized weakness.  

Attempted to lie down with no improvement, 2-3 minutes and presented to the ER.

 States she was also cooking with some spices and wondered if maybe it was an 

ALLERGIC reaction.  Patient also reported developing a migraine post episode and

took a fioicet prior to arrival.  Denies vomiting, abdominal pain, cough, 

congestion, fever or chills.  Chest x-ray reported mild pleural scarring of the 

lung apexes, no acute lung disease.  EKG reported sinus bradycardia with left 

axis deviation.  Troponin is negative 2, third pending.  Chemistry, 

coagulation, hematology unremarkable. Echo pending.





10/20/2020 underwent Lexiscan stress test yesterday reporting apical left 

ventricular myocardial ischemia.  Echo completed reporting normal LV function, 

EF 55-60%, mild-to-moderate mitral regurgitation, mild pulmonary hypertension, 

mild tricuspid regurgitation, mild aortic valve sclerosis.  Asymptomatic 

bradycardia, heart rate in the 50s.  Denies chest pain, palpitations or 

shortness of breath.  Denies lightheadedness dizziness or focal deficits.  

Scheduled for cardiac catheterization tomorrow.





Objective





- Vital Signs


Vital signs: 


                                   Vital Signs











Temp  97.6 F   10/20/20 07:52


 


Pulse  55 L  10/20/20 07:52


 


Resp  16   10/20/20 07:52


 


BP  110/69   10/20/20 07:52


 


Pulse Ox  97   10/20/20 07:52








                                 Intake & Output











 10/19/20 10/20/20 10/20/20





 18:59 06:59 18:59


 


Intake Total 300  


 


Balance 300  


 


Weight 68.04 kg  


 


Intake:   


 


  Oral 300  


 


Other:   


 


  Voiding Method Toilet Toilet 


 


  # Voids 2 1 


 


  # Bowel Movements  2 














- Exam





PHYSICAL EXAM:


VITAL SIGNS: [As above]


GENERAL: Alert and oriented 3, Sitting up in chair, no acute distress


HEENT:  Conjunctivae normal. eyes normal.  Oral mucosa moist


NECK:  No JVD. No thyroid enlargement. No LNs


CARDIOVASCULAR:  S1, S2 regular. No murmur


RESPIRATION: Unlabored ,Breath sounds diminished in the bases.


ABDOMEN:  Soft,  nontender . No guarding. no masses palpable. Bowel sounds 

heard.


LEGS:  No edema. no swelling.


NERVOUS SYSTEM: Cranial N 2-12 grossly normal. Moves all 4 limbs. No focal 

deficits.  Strength and sensation grossly intact.


Skin: Warm and dry, no rash 

















- Labs


CBC & Chem 7: 


                                 10/18/20 20:48





                                 10/18/20 20:48





Assessment and Plan


Assessment: 


Acute Chest pain with accompanying shortness of breath, abnormal Lexiscan, 

cardiac catheterization pending.


Sinus bradycardia, asymptomatic


Mild-to-moderate mitral regurgitation


Mild pulmonary hypertension


Hyperlipidemia


History of extensive smoking, 1-1/2 packs per day 22 years.








Plan: Continue on current medication regime ,monitoring and symptomatic 

treatment.  Scheduled for cardiac cath tomorrow.  Questions and concerns addre

ssed.  Support given.














The impression and plan of care has been dictated as directed.





:


I performed a history and examination of this patient,  discussed the same with 

the dictator.  I agree with the dictator's note ,documented as a scribe.  Any 

additional findings or plans will be noted.

## 2020-10-20 NOTE — EST
Stress Test Results/Findings: 

Exam Performed: NM stress lexiscan cardiolite

Exam Date: 10/19/20

Reason for Exam: Chest Pain



Height: 5 ft 6 in

Weight: 68.04 kg

Protocol: Lexiscan

Stage: na

Duration of Exercise: na



Resting Heart Rate: 46

Resting Blood Pressure: 124/72

Maximum Achieved Heart Rate: 81

Maximum Achieved Blood Pressure: 132/72

85% PMHR: 135

100% PMHR: 159

METS: na

Technologist Comment: 



Stress Test Results/Findings: 

At baseline EKG showed ormal sinus rhythm, normal axis,nonspecific T-wave 
inversions in aVL, AVR, V1  Patient recieved IV infusion of Lexiscan 0.4mg and 
at peak infusion EKG showed o significant EKG changes from baseline.



Conclusions:

1. Normal EKG response to Lexiscan infusion

2. Nuclear imaging to be reported separately. 

MTDD

## 2020-10-21 VITALS — HEART RATE: 48 BPM

## 2020-10-21 VITALS — TEMPERATURE: 97.3 F

## 2020-10-21 VITALS — DIASTOLIC BLOOD PRESSURE: 68 MMHG | SYSTOLIC BLOOD PRESSURE: 130 MMHG

## 2020-10-21 LAB
ANION GAP SERPL CALC-SCNC: 3 MMOL/L
BASOPHILS # BLD AUTO: 0 K/UL (ref 0–0.2)
BASOPHILS NFR BLD AUTO: 1 %
BUN SERPL-SCNC: 16 MG/DL (ref 7–17)
CALCIUM SPEC-MCNC: 8.5 MG/DL (ref 8.4–10.2)
CHLORIDE SERPL-SCNC: 107 MMOL/L (ref 98–107)
CO2 SERPL-SCNC: 29 MMOL/L (ref 22–30)
EOSINOPHIL # BLD AUTO: 0.1 K/UL (ref 0–0.7)
EOSINOPHIL NFR BLD AUTO: 2 %
ERYTHROCYTE [DISTWIDTH] IN BLOOD BY AUTOMATED COUNT: 4.38 M/UL (ref 3.8–5.4)
ERYTHROCYTE [DISTWIDTH] IN BLOOD: 12.8 % (ref 11.5–15.5)
GLUCOSE BLD-MCNC: 96 MG/DL (ref 75–99)
GLUCOSE SERPL-MCNC: 89 MG/DL (ref 74–99)
HCT VFR BLD AUTO: 40.6 % (ref 34–46)
HGB BLD-MCNC: 13.1 GM/DL (ref 11.4–16)
LYMPHOCYTES # SPEC AUTO: 1.4 K/UL (ref 1–4.8)
LYMPHOCYTES NFR SPEC AUTO: 34 %
MCH RBC QN AUTO: 29.9 PG (ref 25–35)
MCHC RBC AUTO-ENTMCNC: 32.3 G/DL (ref 31–37)
MCV RBC AUTO: 92.6 FL (ref 80–100)
MONOCYTES # BLD AUTO: 0.3 K/UL (ref 0–1)
MONOCYTES NFR BLD AUTO: 7 %
NEUTROPHILS # BLD AUTO: 2.3 K/UL (ref 1.3–7.7)
NEUTROPHILS NFR BLD AUTO: 55 %
PLATELET # BLD AUTO: 158 K/UL (ref 150–450)
POTASSIUM SERPL-SCNC: 3.9 MMOL/L (ref 3.5–5.1)
SODIUM SERPL-SCNC: 139 MMOL/L (ref 137–145)
WBC # BLD AUTO: 4.2 K/UL (ref 3.8–10.6)

## 2020-10-21 PROCEDURE — B2111ZZ FLUOROSCOPY OF MULTIPLE CORONARY ARTERIES USING LOW OSMOLAR CONTRAST: ICD-10-PCS

## 2020-10-21 PROCEDURE — 4A023N7 MEASUREMENT OF CARDIAC SAMPLING AND PRESSURE, LEFT HEART, PERCUTANEOUS APPROACH: ICD-10-PCS

## 2020-10-21 RX ADMIN — PANTOPRAZOLE SODIUM SCH MG: 40 INJECTION, POWDER, FOR SOLUTION INTRAVENOUS at 06:04

## 2020-10-21 RX ADMIN — ASPIRIN 81 MG CHEWABLE TABLET SCH: 81 TABLET CHEWABLE at 07:31

## 2020-10-21 NOTE — P.CARDCATH
Date of Procedure: 10/21/20


Description of Procedure: 


PROCEDURES PERFORMED: Left heart catheterization bilateral coronary angiography





INDICATION: Abnormal stress test





HISTORY: Patient is a pleasant 60-year-old female with history of hypertension 

who presented with severe chest pain.  She had normal troponins however had a 

stress test which showed concern of apical reversible ischemia.  Therefore she 

was recommended to have a heart catheterization.





CONSENT:I have discussed the risks, benefits and alternative therapies for the 

above-mentioned procedure and for both sedation/analgesia as well as necessary 

blood product administration, if indicated, as they pertain to this patient.  

The patient has indicated understanding and acceptance of the risks and 

procedures discussed.





PROCEDURE: After the risks, benefits and alternatives of the above mentioned 

procedure explained in detail with the patient, informed consent was obtained.  

Patient was taken to the catheterization lab and prepped and draped in usual 

fashion.  1% lidocaine was used to anesthetize the right radial artery.  A 6-

Austrian sheath was placed in the right radial artery using modified Seldinger 

technique.  Left coronary angiography was performed with a 5-Austrian JL 3.5 

catheter and right coronary angiography was performed with a 5-Austrian JR5 

catheter in various views.  The FL 3.5 catheter had been inserted in the left 

ventricle and pressure measurements were obtained.  The patient tolerated the 

procedure well.  Patient was transported back to the post catheterization 

holding area in stable condition.





Conscious Sedation: Patient was monitored under the direct supervision of vision

of myself for conscious sedation using 1 mg Versed and 25 mcg fentanyl for a 

total duration of 16 minutes   


HEMODYNAMICS: 


Aorta: 146/76


LV: 142/4, LVEDP 15





SELECTIVE CORONARY ARTERIOGRAPHY: 


LEFT MAIN: The left main is a large caliber vessel which bifurcates into the LAD

and circumflex.  There is no significant stenosis.


LEFT ANTERIOR DESCENDING CORONARY ARTERY: LAD is a large caliber vessel which 

wraps around to the apex.  There is no significant stenosis.


LEFT CIRCUMFLEX CORONARY ARTERY: Left circumflex is a moderate caliber vessel 

without significant stenosis.


RIGHT CORONARY ARTERY: The right coronary artery is a large caliber vessel which

gives off a PDA and PLV branch and is the dominant vessel.  There is no 

significant stenosis.








FINAL IMPRESSION: 


1.  Normal coronary arteries as described above.


2.  Normal left-sided pressures





PLAN: 


1.  Aggressive risk factor modification per most recent ACC/AHA guidelines.


2.  Patient stable for discharge home from a cardiac standpoint.  Follow-up in 

office in 1-2 weeks.

## 2020-10-21 NOTE — P.PN
Subjective


Progress Note Date: 10/21/20


This is a 60-year-old female with past medical history of hyperlipidemia, 

hyperglycemia, psoriasis, migraines, extensive ex-smoker; smoked 1-1/2 packs per

day 22 years, presented to the ER with complaints of chest pain accompanied by 

shortness of breath.  Patient stated she was standing up in the kitchen "salad 

spinning", developed abrupt left-sided nonradiating "tight" chest pain with 

grasping shortness of breath accompanied by nausea and generalized weakness.  

Attempted to lie down with no improvement, 2-3 minutes and presented to the ER.

 States she was also cooking with some spices and wondered if maybe it was an 

ALLERGIC reaction.  Patient also reported developing a migraine post episode and

took a fioicet prior to arrival.  Denies vomiting, abdominal pain, cough, 

congestion, fever or chills.  Chest x-ray reported mild pleural scarring of the 

lung apexes, no acute lung disease.  EKG reported sinus bradycardia with left 

axis deviation.  Troponin is negative 2, third pending.  Chemistry, 

coagulation, hematology unremarkable. Echo pending.





10/20/2020 underwent Lexiscan stress test yesterday reporting apical left 

ventricular myocardial ischemia.  Echo completed reporting normal LV function, 

EF 55-60%, mild-to-moderate mitral regurgitation, mild pulmonary hypertension, 

mild tricuspid regurgitation, mild aortic valve sclerosis.  Asymptomatic 

bradycardia, heart rate in the 50s.  Denies chest pain, palpitations or 

shortness of breath.  Denies lightheadedness dizziness or focal deficits.  

Scheduled for cardiac catheterization tomorrow.








10/21/2020 NPO, cardiac cath scheduled for today.  Telemetry sinus bradycardia, 

heart rates in the 50s,VSS. No further chest pressure, chest pain, palpitations 

or shortness of breath.  Denies lightheadedness, dizziness or focal deficits. 

Did not sleep well.  





Objective





- Vital Signs


Vital signs: 


                                   Vital Signs











Temp  97.3 F L  10/21/20 06:08


 


Pulse  53 L  10/21/20 06:08


 


Resp  16   10/21/20 06:08


 


BP  132/75   10/21/20 06:08


 


Pulse Ox  97   10/21/20 06:08








                                 Intake & Output











 10/20/20 10/21/20 10/21/20





 18:59 06:59 18:59


 


Intake Total 940  


 


Balance 940  


 


Intake:   


 


  Oral 940  


 


Other:   


 


  Voiding Method Toilet Toilet 


 


  # Voids 1 2 


 


  # Bowel Movements  1 














- Exam





PHYSICAL EXAM:


VITAL SIGNS: [As above]


GENERAL: Pleasant 60-year-old female, sitting up in bed, smiling, visiting with 

spouse at bedside, alert and oriented 3,  no acute distress


HEENT:  Conjunctivae normal. eyes normal.  Oral mucosa dry.


NECK:  No JVD. No thyroid enlargement. No LNs


CARDIOVASCULAR:  S1, S2 regular. No murmur


RESPIRATION: Unlabored , essentially clear with breath sounds diminished in the 

bases.


ABDOMEN:  Soft,  nontender . No guarding.  Positive Bowel sounds.


LEGS:  No edema. no swelling.


NERVOUS SYSTEM: Cranial N 2-12 grossly intact. Moves all 4 limbs. No focal 

deficits.  Strength and sensation grossly intact.


Skin: Warm and dry, no rash 

















- Labs


CBC & Chem 7: 


                                 10/21/20 07:11





                                 10/21/20 07:11





Assessment and Plan


Assessment: 


Acute Chest pain with accompanying shortness of breath, abnormal Lexiscan, 

cardiac catheterization pending.


Sinus bradycardia, asymptomatic


Mild-to-moderate mitral regurgitation


Mild pulmonary hypertension


Hyperlipidemia


History of extensive smoking, 1-1/2 packs per day 22 years.








Plan: Continue on current medication regime ,monitoring and symptomatic 

treatment.  NPO,scheduled for cardiac cath today.  Magnesium level 

ordered/pending.   at bedside, questions and concerns addressed.Support 

given.  Follow closely with cardiology.














The impression and plan of care has been dictated as directed.





:


I performed a history and examination of this patient,  discussed the same with 

the dictator.  I agree with the dictator's note ,documented as a scribe.  Any 

additional findings or plans will be noted.

## 2020-10-21 NOTE — P.DS
Providers


Date of admission: 


10/19/20 16:02





Expected date of discharge: 10/21/20


Attending physician: 


Colton Pedraza





Consults: 





                                        





10/18/20 22:11


Consult Physician Urgent 


   Consulting Provider: [facility]


   Consult Reason/Comments: chest tightness, short of breath


   Do you want consulting provider notified?: Yes











Primary care physician: 


Colton Pedraza





[facility] Course: 


Final Diagnoses:


Acute Chest pain with accompanying shortness of breath, abnormal Lexiscan, 

cardiac catheterization pending.  Normal coronaries reported per cardiac cath.


Sinus bradycardia, asymptomatic


Mild-to-moderate mitral regurgitation


Mild pulmonary hypertension


Hyperlipidemia


History of extensive smoking, 1-1/2 packs per day 22 years.






































Hospital course:This is a 60-year-old female with past medical history of 

hyperlipidemia, hyperglycemia, psoriasis, migraines, extensive ex-smoker; smoked

1-1/2 packs per day 22 years, presented to the ER with complaints of chest pain

accompanied by shortness of breath.  Patient stated she was standing up in the 

kitchen "salad spinning", developed abrupt left-sided nonradiating "tight" chest

pain with grasping shortness of breath accompanied by nausea and generalized 

weakness.  Attempted to lie down with no improvement, 2-3 minutes and presented

to the ER.  States she was also cooking with some spices and wondered if maybe 

it was an ALLERGIC reaction.  Patient also reported developing a migraine post 

episode and took a fioicet prior to arrival.  Denies vomiting, abdominal pain, 

cough, congestion, fever or chills.  Chest x-ray reported mild pleural scarring 

of the lung apexes, no acute lung disease.  EKG reported sinus bradycardia with 

left axis deviation.  Troponin is negative 2, third pending.  Chemistry, 

coagulation, hematology unremarkable. Echo pending.





10/20/2020 underwent Lexiscan stress test yesterday reporting apical left 

ventricular myocardial ischemia.  Echo completed reporting normal LV function, 

EF 55-60%, mild-to-moderate mitral regurgitation, mild pulmonary hypertension, 

mild tricuspid regurgitation, mild aortic valve sclerosis.  Asymptomatic 

bradycardia, heart rate in the 50s.  Denies chest pain, palpitations or arpita

rtness of breath.  Denies lightheadedness dizziness or focal deficits.  

Scheduled for cardiac catheterization tomorrow.








10/21/2020 NPO, cardiac cath scheduled for today.  Telemetry sinus bradycardia, 

heart rates in the 50s,VSS. No further chest pressure, chest pain, palpitations 

or shortness of breath.  Denies lightheadedness, dizziness or focal deficits. 

Did not sleep well.  








Continue cardiac catheterization, reporting normal coronary arteries, normal 

left-sided pressures .tolerated well.  Aggressive risk factor modification 

advised.  Cleared by cardiology for discharge.


Patient will be discharged home today in a stable condition with guarded 

prognosis.











The impression and plan of care has been dictated as directed.





:


I performed a history and examination of this patient,  discussed the same with 

the dictator.  I agree with the dictator's note ,documented as a scribe.  Any 

additional findings or plans will be noted.








Patient Condition at Discharge: Stable





Plan - Discharge Summary


Discharge Rx Participant: No


New Discharge Prescriptions: 


Continue


   Butalb/Acetaminophen/Caffeine [Fioricet -40 mg Capsule] 1 tab PO DAILY 

PRN


     PRN Reason: Migraine Headache


   Cholecalciferol [Vitamin D3 (25 Mcg = 1000 Iu)] 1,000 unit PO DAILY


   Atorvastatin [Lipitor] 10 mg PO HS


   Omega-3 Fatty Acids/Fish Oil [Fish Oil 1,000 mg Softgel] 1 cap PO DAILY


   Biotin 5 mg PO DAILY


   Vitamin E 100 unit PO DAILY


Discharge Medication List





Butalb/Acetaminophen/Caffeine [Fioricet -40 mg Capsule] 1 tab PO DAILY PRN

10/31/16 [History]


Atorvastatin [Lipitor] 10 mg PO HS 11/11/18 [History]


Biotin 5 mg PO DAILY 11/11/18 [History]


Cholecalciferol [Vitamin D3 (25 Mcg = 1000 Iu)] 1,000 unit PO DAILY 11/11/18 

[History]


Omega-3 Fatty Acids/Fish Oil [Fish Oil 1,000 mg Softgel] 1 cap PO DAILY 11/11/18

[History]


Vitamin E 100 unit PO DAILY 10/18/20 [History]








Follow up Appointment(s)/Referral(s): 


Colton Pedraza DO [Primary Care Provider] - 3 Days


Patient Instructions/Handouts:  How to Stop Smoking (DC)


Activity/Diet/Wound Care/Special Instructions: 


Confirm cardiology follow-up appointment prior to discharge.

## 2021-01-07 ENCOUNTER — HOSPITAL ENCOUNTER (OUTPATIENT)
Dept: HOSPITAL 47 - RADMAMWWP | Age: 61
Discharge: HOME | End: 2021-01-07
Attending: OBSTETRICS & GYNECOLOGY
Payer: COMMERCIAL

## 2021-01-07 DIAGNOSIS — Z12.31: Primary | ICD-10-CM

## 2021-01-07 PROCEDURE — 77067 SCR MAMMO BI INCL CAD: CPT

## 2021-01-11 NOTE — MM
Reason for exam: screening  (asymptomatic).

Last mammogram was performed 2 years and 2 months ago.



History:

Patient is postmenopausal.

Took progesterone beginning at age 39.



Physical Findings:

A clinical breast exam by your physician is recommended on an annual basis and 

results should be correlated with mammographic findings.



MG Screening Mammo w CAD

Bilateral CC and MLO view(s) were taken.

Prior study comparison: November 7, 2018, mammogram, performed at [facility].  March 30, 2015, bilateral MG screening mammo w CAD.  November 20, 2012, mammogram, performed at [facility].

There are scattered fibroglandular densities.  There are benign appearing round 

calcifications bilaterally. There is no discrete abnormality.





ASSESSMENT: Benign, BI-RAD 2



RECOMMENDATION:

Routine screening mammogram of both breasts in 1 year.

## 2021-04-16 ENCOUNTER — HOSPITAL ENCOUNTER (OUTPATIENT)
Dept: HOSPITAL 47 - LABWHC1 | Age: 61
Discharge: HOME | End: 2021-04-16
Attending: FAMILY MEDICINE
Payer: COMMERCIAL

## 2021-04-16 DIAGNOSIS — Z20.822: Primary | ICD-10-CM

## 2022-12-07 ENCOUNTER — HOSPITAL ENCOUNTER (OUTPATIENT)
Dept: HOSPITAL 47 - RADBDWWP | Age: 62
Discharge: HOME | End: 2022-12-07
Attending: OBSTETRICS & GYNECOLOGY
Payer: COMMERCIAL

## 2022-12-07 DIAGNOSIS — M85.89: ICD-10-CM

## 2022-12-07 DIAGNOSIS — Z78.0: ICD-10-CM

## 2022-12-07 DIAGNOSIS — M81.0: Primary | ICD-10-CM

## 2022-12-07 PROCEDURE — 77080 DXA BONE DENSITY AXIAL: CPT

## 2022-12-07 NOTE — BD
EXAMINATION TYPE: Axial Bone Density

 

DATE OF EXAM: 12/7/2022

 

COMPARISON: NONE

 

CLINICAL HISTORY: 62 years year old Female.  ICD-10 CODE: Z78.0 ASYMPTOMATIC MENOPAUSAL STATE

 

Height:  5 FT 6 IN

Weight:  148

 

FRAX RISK QUESTIONS:

Alcohol (3 or more units per day):  NO

Family History (Parent hip fracture):  YES

Glucocorticoids (More than 3mos):  NO

           (Ex: prednisone, prednisolone, methylprednisolone, dexamethasone, and hydrocortisone).    
     

History of Fracture in Adulthood: YES

Secondary Osteoporosis:

  1.  Type 1 Diabetes: NO

  2.  Hyperthyroidism: NO

  3.  Menopause before 45: YES

  4.  Malnutrition: NO

  5.  Chronic liver disease: NO

Rheumatoid Arthritis: NO

Current Tobacco Use: NO

 

RISK FACTORS 

HISTORY OF: 

History of Wrist Fracture: WILLIAM WRIST BX

When: 2015

Surgery to Spine/Hip(right/left)/Wrist (right/left): BX OF BONE LUMBAR 

Family History of Osteoporosis: NO

Active: YES

Diet low in dairy products/other sources of calcium:  NO

Postmenopausal woman: YES

Take estrogen and/or progesterone medications: NO

Lost more than 2 inches in height since high school: NO

Frequent falls: NO

Poor Health: GOOD

Hyperparathyroidism: NO

Adrenal Insufficiency: NO

 

MEDICATIONS: 

Additional Medications: ATORVASTATIN, FEORICET 

 

 

Additional History:

 

 

EXAM MEASUREMENTS: 

Bone mineral densitometry was performed using the [employer].

Bone mineral density as measured about the Lumbar spine is:  

----- L1-L4(G/cm2): 0.889

T Score Values are as follows:

----- L1: -2.9

----- L2: -2.4

----- L3: -1.9

----- L4: -2.6

----- L1-L4: -2.8

Bone mineral density has: DECREASED  -6.6 % since study of: 2015

 

Bone mineral density about the R hip (g/cm2): 0.848

Bone mineral density about the L hip (g/cm2): 0.804

T Score values are as follows:

-----R Neck: -1.4

-----L Neck: -1.7

-----R Total: -1.5

-----L Total: -1.6

Bone mineral density has: DECREASED  -7.0 % since study of: 2015

 

 

FRAX%s: The graph provided illustrates a 9.1 % chance for a major osteoporotic fx and a 1.0 % chance 
for the hips probability for fx in 10 years time.

 

IMPRESSION:

Osteoporosis (T Score less than -2.5).

 

There is increased fracture risk and therapy is usually indicated based on age.

 

Re-Screen 1-2 years.

 

NOTE:  T-SCORE=SD OF THE YOUNG ADULT MEAN.

## 2023-09-15 ENCOUNTER — HOSPITAL ENCOUNTER (OUTPATIENT)
Dept: HOSPITAL 47 - LABWHC1 | Age: 63
Discharge: HOME | End: 2023-09-15
Attending: FAMILY MEDICINE
Payer: COMMERCIAL

## 2023-09-15 DIAGNOSIS — Z00.00: Primary | ICD-10-CM

## 2023-09-15 LAB
ALBUMIN SERPL-MCNC: 4.4 D/DL (ref 3.8–4.9)
ALBUMIN/GLOB SERPL: 2.2 RATIO (ref 1.6–3.17)
ALP SERPL-CCNC: 59 U/L (ref 41–126)
ALT SERPL-CCNC: 28 U/L (ref 8–44)
ANION GAP SERPL CALC-SCNC: 9.3 MMOL/L (ref 4–12)
AST SERPL-CCNC: 27 U/L (ref 13–35)
BUN SERPL-SCNC: 20 MG/DL (ref 9–27)
BUN/CREAT SERPL: 25 RATIO (ref 12–20)
CALCIUM SPEC-MCNC: 9.1 MG/DL (ref 8.7–10.3)
CHLORIDE SERPL-SCNC: 104 MMOL/L (ref 96–109)
CHOLEST SERPL-MCNC: 243 MG/DL (ref 0–200)
CO2 SERPL-SCNC: 25.7 MMOL/L (ref 21.6–31.8)
ERYTHROCYTE [DISTWIDTH] IN BLOOD BY AUTOMATED COUNT: 4.77 X 10*6/UL (ref 4.1–5.2)
ERYTHROCYTE [DISTWIDTH] IN BLOOD: 13.4 % (ref 11.5–14.5)
GLOBULIN SER CALC-MCNC: 2 D/DL (ref 1.6–3.3)
GLUCOSE SERPL-MCNC: 112 MG/DL (ref 70–110)
HCT VFR BLD AUTO: 43 % (ref 37.2–46.3)
HDLC SERPL-MCNC: 79.9 MG/DL (ref 40–60)
HGB BLD-MCNC: 13.7 D/DL (ref 12–15)
LDLC SERPL CALC-MCNC: 149.9 MG/DL (ref 0–131)
MCH RBC QN AUTO: 28.7 PG (ref 27–32)
MCHC RBC AUTO-ENTMCNC: 31.9 D/DL (ref 32–37)
MCV RBC AUTO: 90.1 FL (ref 80–97)
NRBC BLD AUTO-RTO: 0 X 10*3/UL (ref 0–0.01)
PLATELET # BLD AUTO: 223 X 10*3/UL (ref 140–440)
POTASSIUM SERPL-SCNC: 4.6 MMOL/L (ref 3.5–5.5)
PROT SERPL-MCNC: 6.4 D/DL (ref 6.2–8.2)
SODIUM SERPL-SCNC: 139 MMOL/L (ref 135–145)
TRIGL SERPL-MCNC: 65.9 MG/DL (ref 0–149)
VLDLC SERPL CALC-MCNC: 13.18 MG/DL (ref 5–40)
WBC # BLD AUTO: 9.66 X 10*3/UL (ref 4.5–10)

## 2023-09-15 PROCEDURE — 80053 COMPREHEN METABOLIC PANEL: CPT

## 2023-09-15 PROCEDURE — 80061 LIPID PANEL: CPT

## 2023-09-15 PROCEDURE — 84443 ASSAY THYROID STIM HORMONE: CPT

## 2023-09-15 PROCEDURE — 85027 COMPLETE CBC AUTOMATED: CPT

## 2023-09-15 PROCEDURE — 36415 COLL VENOUS BLD VENIPUNCTURE: CPT

## 2024-06-13 ENCOUNTER — HOSPITAL ENCOUNTER (OUTPATIENT)
Dept: HOSPITAL 47 - RADMAMWWP | Age: 64
Discharge: HOME | End: 2024-06-13
Attending: FAMILY MEDICINE
Payer: COMMERCIAL

## 2024-06-13 DIAGNOSIS — Z12.31: Primary | ICD-10-CM

## 2024-06-13 DIAGNOSIS — Z78.0: ICD-10-CM

## 2024-06-13 PROCEDURE — 77067 SCR MAMMO BI INCL CAD: CPT

## 2024-06-13 PROCEDURE — 77063 BREAST TOMOSYNTHESIS BI: CPT

## 2024-06-18 NOTE — MM
Reason for Exam: Screening  (asymptomatic). 

Last mammogram was performed 3 year(s) and 5 month(s) ago. 





Patient History: 

Menarche at age 10. First Full-Term Pregnancy at age 20. Left ovary removed at age 39. Right ovary

removed at age 39. Hysterectomy at age 39. Postmenopausal. Patient has history of breast feeding.

Progesterone, from age 39 until age 40. 





Risk Values: 

Mara 5 year model risk: 1.6%.

NCI Lifetime model risk: 6.4%.





Prior Study Comparison: 

3/30/2015 Bilateral Screening Mammogram, [facility]. 11/7/2018  Screening Mammogram, [facility]. 1/7/2021 Bilateral Screening Mammogram, [facility]. 





Tissue Density: 

There are scattered areas of fibroglandular density.





Findings: 

Analyzed By CAD. 

Right breast: There is no suspicious group of microcalcifications or new suspicious mass.



Left breast: There is no suspicious group of microcalcifications or new suspicious mass. 





Overall Assessment: Negative, BI-RAD 1





Management: 

Screening Mammogram of both breasts in 1 year.

[facility] will attempt to contact patient to return for supplemental views and

ultrasound if indicated.



Patient should continue monthly self-breast exams.  A clinical breast exam by your physician is

recommended on an annual basis.

This exam should not preclude additional follow-up of suspicious palpable abnormalities.



Note on Mara scores and lifetime risk:

1. A Mara score greater than 3% is considered moderate risk. If this is the case, consider

specialist referral to assess eligibility for a risk reducing agent.

2. If overall lifetime risk for the development of breast cancer is 20% or higher, the patient may

qualify for future screening with alternating mammogram and breast MRI.



Electronically signed and approved by: Colton Wood DO

## 2024-10-25 ENCOUNTER — HOSPITAL ENCOUNTER (OUTPATIENT)
Dept: HOSPITAL 47 - LABWHC1 | Age: 64
Discharge: HOME | End: 2024-10-25
Attending: FAMILY MEDICINE
Payer: COMMERCIAL

## 2024-10-25 LAB
ALBUMIN SERPL-MCNC: 4.2 G/DL (ref 3.8–4.9)
ALBUMIN/GLOB SERPL: 1.91 RATIO (ref 1.6–3.17)
ALP SERPL-CCNC: 66 U/L (ref 41–126)
ALT SERPL-CCNC: 24 U/L (ref 8–44)
ANION GAP SERPL CALC-SCNC: 9.3 MMOL/L (ref 4–12)
AST SERPL-CCNC: 23 U/L (ref 13–35)
BASOPHILS # BLD AUTO: 0.06 X 10*3/UL (ref 0–0.1)
BASOPHILS NFR BLD AUTO: 1.4 %
BUN SERPL-SCNC: 13.1 MG/DL (ref 9–27)
BUN/CREAT SERPL: 14.56 RATIO (ref 12–20)
CALCIUM SPEC-MCNC: 9.4 MG/DL (ref 8.7–10.3)
CHLORIDE SERPL-SCNC: 107 MMOL/L (ref 96–109)
CHOLEST SERPL-MCNC: 239 MG/DL (ref 0–200)
CO2 SERPL-SCNC: 27.7 MMOL/L (ref 21.6–31.8)
EOSINOPHIL # BLD AUTO: 0.08 X 10*3/UL (ref 0.04–0.35)
EOSINOPHIL NFR BLD AUTO: 1.8 %
ERYTHROCYTE [DISTWIDTH] IN BLOOD BY AUTOMATED COUNT: 4.62 X 10*6/UL (ref 4.1–5.2)
ERYTHROCYTE [DISTWIDTH] IN BLOOD: 13.2 % (ref 11.5–14.5)
GLOBULIN SER CALC-MCNC: 2.2 G/DL (ref 1.6–3.3)
GLUCOSE SERPL-MCNC: 82 MG/DL (ref 70–110)
HCT VFR BLD AUTO: 43 % (ref 37.2–46.3)
HDLC SERPL-MCNC: 72.5 MG/DL (ref 40–60)
HGB BLD-MCNC: 13.7 G/DL (ref 12–15)
IMM GRANULOCYTES BLD QL AUTO: 0.5 %
LDLC SERPL CALC-MCNC: 153.5 MG/DL (ref 0–131)
LYMPHOCYTES # SPEC AUTO: 1.24 X 10*3/UL (ref 0.9–5)
LYMPHOCYTES NFR SPEC AUTO: 28.1 %
MCH RBC QN AUTO: 29.7 PG (ref 27–32)
MCHC RBC AUTO-ENTMCNC: 31.9 G/DL (ref 32–37)
MCV RBC AUTO: 93.1 FL (ref 80–97)
MONOCYTES # BLD AUTO: 0.46 X 10*3/UL (ref 0.2–1)
MONOCYTES NFR BLD AUTO: 10.4 %
NEUTROPHILS # BLD AUTO: 2.55 X 10*3/UL (ref 1.8–7.7)
NEUTROPHILS NFR BLD AUTO: 57.8 %
NRBC BLD AUTO-RTO: 0 X 10*3/UL (ref 0–0.01)
PLATELET # BLD AUTO: 208 X 10*3/UL (ref 140–440)
POTASSIUM SERPL-SCNC: 4.6 MMOL/L (ref 3.5–5.5)
PROT SERPL-MCNC: 6.4 G/DL (ref 6.2–8.2)
SODIUM SERPL-SCNC: 144 MMOL/L (ref 135–145)
TRIGL SERPL-MCNC: 65.2 MG/DL (ref 0–149)
VLDLC SERPL CALC-MCNC: 13.04 MG/DL (ref 5–40)
WBC # BLD AUTO: 4.41 X 10*3/UL (ref 4.5–10)

## 2024-10-25 PROCEDURE — 80053 COMPREHEN METABOLIC PANEL: CPT

## 2024-10-25 PROCEDURE — 80061 LIPID PANEL: CPT

## 2024-10-25 PROCEDURE — 85025 COMPLETE CBC W/AUTO DIFF WBC: CPT

## 2024-10-25 PROCEDURE — 83036 HEMOGLOBIN GLYCOSYLATED A1C: CPT

## 2024-10-25 PROCEDURE — 36415 COLL VENOUS BLD VENIPUNCTURE: CPT

## 2024-10-25 PROCEDURE — 84443 ASSAY THYROID STIM HORMONE: CPT

## 2025-04-14 ENCOUNTER — HOSPITAL ENCOUNTER (OUTPATIENT)
Dept: HOSPITAL 47 - 3 N SLEEP | Age: 65
End: 2025-04-14
Attending: INTERNAL MEDICINE
Payer: MEDICARE

## 2025-04-14 DIAGNOSIS — Z91.018: ICD-10-CM

## 2025-04-14 DIAGNOSIS — G47.33: Primary | ICD-10-CM

## 2025-04-14 DIAGNOSIS — Z87.891: ICD-10-CM

## 2025-04-14 DIAGNOSIS — Z91.048: ICD-10-CM

## 2025-04-14 DIAGNOSIS — Z91.011: ICD-10-CM

## 2025-04-14 DIAGNOSIS — Z88.2: ICD-10-CM
